# Patient Record
Sex: FEMALE | Race: ASIAN
[De-identification: names, ages, dates, MRNs, and addresses within clinical notes are randomized per-mention and may not be internally consistent; named-entity substitution may affect disease eponyms.]

---

## 2020-01-24 PROBLEM — Z00.00 ENCOUNTER FOR PREVENTIVE HEALTH EXAMINATION: Status: ACTIVE | Noted: 2020-01-24

## 2020-05-26 ENCOUNTER — APPOINTMENT (OUTPATIENT)
Dept: NEUROLOGY | Facility: CLINIC | Age: 75
End: 2020-05-26

## 2022-09-22 ENCOUNTER — EMERGENCY (EMERGENCY)
Facility: HOSPITAL | Age: 77
LOS: 0 days | Discharge: HOME | End: 2022-09-22
Attending: EMERGENCY MEDICINE
Payer: MEDICARE

## 2022-09-22 VITALS
HEART RATE: 72 BPM | OXYGEN SATURATION: 95 % | TEMPERATURE: 98 F | RESPIRATION RATE: 17 BRPM | DIASTOLIC BLOOD PRESSURE: 61 MMHG | SYSTOLIC BLOOD PRESSURE: 127 MMHG

## 2022-09-22 VITALS — WEIGHT: 149.91 LBS

## 2022-09-22 DIAGNOSIS — Z79.82 LONG TERM (CURRENT) USE OF ASPIRIN: ICD-10-CM

## 2022-09-22 DIAGNOSIS — N39.0 URINARY TRACT INFECTION, SITE NOT SPECIFIED: ICD-10-CM

## 2022-09-22 DIAGNOSIS — E11.9 TYPE 2 DIABETES MELLITUS WITHOUT COMPLICATIONS: ICD-10-CM

## 2022-09-22 DIAGNOSIS — R31.9 HEMATURIA, UNSPECIFIED: ICD-10-CM

## 2022-09-22 LAB
ALBUMIN SERPL ELPH-MCNC: 3.5 G/DL — SIGNIFICANT CHANGE UP (ref 3.3–5)
ALP SERPL-CCNC: 74 U/L — SIGNIFICANT CHANGE UP (ref 40–120)
ALT FLD-CCNC: 25 U/L — SIGNIFICANT CHANGE UP (ref 12–78)
ANION GAP SERPL CALC-SCNC: 5 MMOL/L — SIGNIFICANT CHANGE UP (ref 5–17)
APPEARANCE UR: ABNORMAL
AST SERPL-CCNC: 11 U/L — LOW (ref 15–37)
BASOPHILS # BLD AUTO: 0.05 K/UL — SIGNIFICANT CHANGE UP (ref 0–0.2)
BASOPHILS NFR BLD AUTO: 0.5 % — SIGNIFICANT CHANGE UP (ref 0–2)
BILIRUB SERPL-MCNC: 0.4 MG/DL — SIGNIFICANT CHANGE UP (ref 0.2–1.2)
BILIRUB UR-MCNC: NEGATIVE — SIGNIFICANT CHANGE UP
BUN SERPL-MCNC: 14 MG/DL — SIGNIFICANT CHANGE UP (ref 7–23)
CALCIUM SERPL-MCNC: 9.4 MG/DL — SIGNIFICANT CHANGE UP (ref 8.5–10.1)
CHLORIDE SERPL-SCNC: 108 MMOL/L — SIGNIFICANT CHANGE UP (ref 96–108)
CO2 SERPL-SCNC: 27 MMOL/L — SIGNIFICANT CHANGE UP (ref 22–31)
COLOR SPEC: ABNORMAL
CREAT SERPL-MCNC: 0.93 MG/DL — SIGNIFICANT CHANGE UP (ref 0.5–1.3)
DIFF PNL FLD: ABNORMAL
EGFR: 63 ML/MIN/1.73M2 — SIGNIFICANT CHANGE UP
EOSINOPHIL # BLD AUTO: 0.14 K/UL — SIGNIFICANT CHANGE UP (ref 0–0.5)
EOSINOPHIL NFR BLD AUTO: 1.5 % — SIGNIFICANT CHANGE UP (ref 0–6)
GLUCOSE SERPL-MCNC: 242 MG/DL — HIGH (ref 70–99)
GLUCOSE UR QL: 1000 MG/DL
HCT VFR BLD CALC: 46.3 % — HIGH (ref 34.5–45)
HGB BLD-MCNC: 15.1 G/DL — SIGNIFICANT CHANGE UP (ref 11.5–15.5)
IMM GRANULOCYTES NFR BLD AUTO: 0.3 % — SIGNIFICANT CHANGE UP (ref 0–0.9)
KETONES UR-MCNC: NEGATIVE — SIGNIFICANT CHANGE UP
LEUKOCYTE ESTERASE UR-ACNC: ABNORMAL
LYMPHOCYTES # BLD AUTO: 2.08 K/UL — SIGNIFICANT CHANGE UP (ref 1–3.3)
LYMPHOCYTES # BLD AUTO: 22.4 % — SIGNIFICANT CHANGE UP (ref 13–44)
MCHC RBC-ENTMCNC: 29.7 PG — SIGNIFICANT CHANGE UP (ref 27–34)
MCHC RBC-ENTMCNC: 32.6 GM/DL — SIGNIFICANT CHANGE UP (ref 32–36)
MCV RBC AUTO: 91.1 FL — SIGNIFICANT CHANGE UP (ref 80–100)
MONOCYTES # BLD AUTO: 0.73 K/UL — SIGNIFICANT CHANGE UP (ref 0–0.9)
MONOCYTES NFR BLD AUTO: 7.9 % — SIGNIFICANT CHANGE UP (ref 2–14)
NEUTROPHILS # BLD AUTO: 6.24 K/UL — SIGNIFICANT CHANGE UP (ref 1.8–7.4)
NEUTROPHILS NFR BLD AUTO: 67.4 % — SIGNIFICANT CHANGE UP (ref 43–77)
NITRITE UR-MCNC: NEGATIVE — SIGNIFICANT CHANGE UP
PH UR: 6 — SIGNIFICANT CHANGE UP (ref 5–8)
PLATELET # BLD AUTO: 279 K/UL — SIGNIFICANT CHANGE UP (ref 150–400)
POTASSIUM SERPL-MCNC: 3.9 MMOL/L — SIGNIFICANT CHANGE UP (ref 3.5–5.3)
POTASSIUM SERPL-SCNC: 3.9 MMOL/L — SIGNIFICANT CHANGE UP (ref 3.5–5.3)
PROT SERPL-MCNC: 7.4 GM/DL — SIGNIFICANT CHANGE UP (ref 6–8.3)
PROT UR-MCNC: 30 MG/DL
RBC # BLD: 5.08 M/UL — SIGNIFICANT CHANGE UP (ref 3.8–5.2)
RBC # FLD: 14.8 % — HIGH (ref 10.3–14.5)
SODIUM SERPL-SCNC: 140 MMOL/L — SIGNIFICANT CHANGE UP (ref 135–145)
SP GR SPEC: 1.01 — SIGNIFICANT CHANGE UP (ref 1.01–1.02)
UROBILINOGEN FLD QL: NEGATIVE — SIGNIFICANT CHANGE UP
WBC # BLD: 9.27 K/UL — SIGNIFICANT CHANGE UP (ref 3.8–10.5)
WBC # FLD AUTO: 9.27 K/UL — SIGNIFICANT CHANGE UP (ref 3.8–10.5)

## 2022-09-22 PROCEDURE — 99283 EMERGENCY DEPT VISIT LOW MDM: CPT

## 2022-09-22 PROCEDURE — 81001 URINALYSIS AUTO W/SCOPE: CPT

## 2022-09-22 PROCEDURE — 80053 COMPREHEN METABOLIC PANEL: CPT

## 2022-09-22 PROCEDURE — 87086 URINE CULTURE/COLONY COUNT: CPT

## 2022-09-22 PROCEDURE — 99284 EMERGENCY DEPT VISIT MOD MDM: CPT | Mod: FS

## 2022-09-22 PROCEDURE — 36415 COLL VENOUS BLD VENIPUNCTURE: CPT

## 2022-09-22 PROCEDURE — 85025 COMPLETE CBC W/AUTO DIFF WBC: CPT

## 2022-09-22 RX ORDER — CEPHALEXIN 500 MG
500 CAPSULE ORAL ONCE
Refills: 0 | Status: COMPLETED | OUTPATIENT
Start: 2022-09-22 | End: 2022-09-22

## 2022-09-22 RX ORDER — CEPHALEXIN 500 MG
1 CAPSULE ORAL
Qty: 21 | Refills: 0
Start: 2022-09-22

## 2022-09-22 RX ADMIN — Medication 500 MILLIGRAM(S): at 13:30

## 2022-09-22 NOTE — ED STATDOCS - PATIENT PORTAL LINK FT
You can access the FollowMyHealth Patient Portal offered by [facility] by registering at the following website: [email or web address] By joining [employer]’s FollowMyHealth portal, you will also be able to view your health information using other applications (apps) compatible with our system.

## 2022-09-22 NOTE — ED STATDOCS - CARE PROVIDER_API CALL
Bryan Romero)  Urology  [address], [address] [place] [address]  Phone: [phone]  Fax: [phone]  Follow Up Time: Urgent

## 2022-09-22 NOTE — ED STATDOCS - PROGRESS NOTE DETAILS
PA note: All labwork/radiology results discussed in detail with patient. Patient re-examined and re-evaluated. Patient feels much better at this time. ED evaluation, Diagnosis and management discussed with the patient in detail. Workup results discussed with ED attending, OK to [place] home. Close UROLOGY MD follow up encouraged, aftercare to assist with scheduling appointment ASAP. Strict ED return instructions discussed in detail and patient given the opportunity to ask any questions about their discharge diagnosis and instructions. Patient verbalized understanding. ~ Gopi Roy PA-C PA: Patient is a 77 y/o female with PMHx of DM who presents to [facility] c/o hematuria x2 days. Pt states that she only has bleeding when she urinates and does not have any other vaginal bleeding. DENIES fever, chills, flank or back pain. No hx of kidney stones. ~Gopi Roy PA-C

## 2022-09-22 NOTE — ED STATDOCS - OBJECTIVE STATEMENT
77 y/o female with PMHx of DM presents to the ED c/o hematuria x2 days. Pt states that she only has bleeding when she urinates and does not have any other vaginal bleeding. Denies any recent traumas. Pt is on ASA. Denies pain anywhere else, denies lightheadedness, denies weakness.

## 2022-09-22 NOTE — ED STATDOCS - CLINICAL SUMMARY MEDICAL DECISION MAKING FREE TEXT BOX
Unclear etiology of hematuria, pt still urinating without issue. No significant risk factors for major hematuria, does not appear consistent with kidney stone. Will eval for uti vs hemorrhagic cystitis. Will likely need gyn outpatient follow up. Dispo pending UA Unclear etiology of hematuria, pt still urinating without issue. No significant risk factors for major hematuria, does not appear consistent with kidney stone. Will eval for uti vs hemorrhagic cystitis. Will likely need gyn outpatient follow up. Dispo pending UA    PA note: All labwork/radiology results discussed in detail with patient. Patient re-examined and re-evaluated. Patient feels much better at this time. ED evaluation, Diagnosis and management discussed with the patient in detail. Workup results discussed with ED attending, OK to [place] home. Close UROLOGY MD follow up encouraged, aftercare to assist with scheduling appointment ASAP. Strict ED return instructions discussed in detail and patient given the opportunity to ask any questions about their discharge diagnosis and instructions. Patient verbalized understanding. ~ Gopi Roy PA-C

## 2022-09-22 NOTE — ED ADULT TRIAGE NOTE - CHIEF COMPLAINT QUOTE
Pt c/o painless hematuria x2 days. On aspirin. Instructed to come to ED by PCP. Denies any other complaints.

## 2022-09-25 LAB
CULTURE RESULTS: SIGNIFICANT CHANGE UP
SPECIMEN SOURCE: SIGNIFICANT CHANGE UP

## 2023-07-26 NOTE — ED STATDOCS - NSFOLLOWUPINSTRUCTIONS_ED_ALL_ED_FT
26-Jul-2023 Urinary Tract Infection, Adult       A urinary tract infection (UTI) is an infection of any part of the urinary tract. The urinary tract includes:  •The kidneys.      •The ureters.      •The bladder.      •The urethra.      These organs make, store, and get rid of pee (urine) in the body.      What are the causes?    This infection is caused by germs (bacteria) in your genital area. These germs grow and cause swelling (inflammation) of your urinary tract.      What increases the risk?    The following factors may make you more likely to develop this condition:  •Using a small, thin tube (catheter) to drain pee.      •Not being able to control when you pee or poop (incontinence).    •Being female. If you are female, these things can increase the risk:•Using these methods to prevent pregnancy:  •A medicine that kills sperm (spermicide).      •A device that blocks sperm (diaphragm).        •Having low levels of a female hormone (estrogen).      •Being pregnant.        You are more likely to develop this condition if:  •You have genes that add to your risk.      •You are sexually active.      •You take antibiotic medicines.     •You have trouble peeing because of:  •A prostate that is bigger than normal, if you are male.      •A blockage in the part of your body that drains pee from the bladder.      •A kidney stone.       •A nerve condition that affects your bladder.      •Not getting enough to drink.       •Not peeing often enough.      •You have other conditions, such as:  •Diabetes.       •A weak disease-fighting system (immune system).      •Sickle cell disease.       •Gout.       •Injury of the spine.          What are the signs or symptoms?    Symptoms of this condition include:  •Needing to pee right away.      •Peeing small amounts often.      •Pain or burning when peeing.      •Blood in the pee.      •Pee that smells bad or not like normal.      •Trouble peeing.      •Pee that is cloudy.      •Fluid coming from the vagina, if you are female.      •Pain in the belly or lower back.      Other symptoms include:  •Vomiting.      •Not feeling hungry.      •Feeling mixed up (confused). This may be the first symptom in older adults.      •Being tired and grouchy (irritable).      •A fever.      •Watery poop (diarrhea).        How is this treated?    •Taking antibiotic medicine.      •Taking other medicines.      •Drinking enough water.      In some cases, you may need to see a specialist.      Follow these instructions at home:     Medicines     •Take over-the-counter and prescription medicines only as told by your doctor.      •If you were prescribed an antibiotic medicine, take it as told by your doctor. Do not stop taking it even if you start to feel better.      General instructions   •Make sure you:  •Pee until your bladder is empty.       •Do not hold pee for a long time.      •Empty your bladder after sex.      •Wipe from front to back after peeing or pooping if you are a female. Use each tissue one time when you wipe.        •Drink enough fluid to keep your pee pale yellow.      •Keep all follow-up visits.        Contact a doctor if:    •You do not get better after 1–2 days.      •Your symptoms go away and then come back.        Get help right away if:    •You have very bad back pain.      •You have very bad pain in your lower belly.      •You have a fever.      •You have chills.      •You feeling like you will vomit or you vomit.        Summary    •A urinary tract infection (UTI) is an infection of any part of the urinary tract.      •This condition is caused by germs in your genital area.      •There are many risk factors for a UTI.      •Treatment includes antibiotic medicines.      •Drink enough fluid to keep your pee pale yellow.      This information is not intended to replace advice given to you by your health care provider. Make sure you discuss any questions you have with your health care provider.                                                                                                   Hematuria, Adult       Hematuria is blood in the urine. Blood may be visible in the urine, or it may be identified with a test. This condition can be caused by infections of the bladder, urethra, kidney, or prostate. Other possible causes include:  • Urinary tract infection (Bladder Infection)    •Kidney stones.      •Cancer of the urinary tract.      •Too much calcium in the urine.      •Conditions that are passed from parent to child (inherited conditions).       •Exercise that requires a lot of energy.      Infections can usually be treated with medicine, and a kidney stone usually will pass through your urine. If neither of these is the cause of your hematuria, more tests may be needed to identify the cause of your symptoms.    It is very important to tell your health care provider about any blood in your urine, even if it is painless or the blood stops without treatment. Blood in the urine, when it happens and then stops and then happens again, can be a symptom of a very serious condition, including cancer. There is no pain in the initial stages of many urinary cancers.      Follow these instructions at home:    Medicines     •Take over-the-counter and prescription medicines only as told by your health care provider.      •If you were prescribed an antibiotic medicine, take it as told by your health care provider. Do not stop taking the antibiotic even if you start to feel better.      Eating and drinking     •Drink enough fluid to keep your urine pale yellow. It is recommended that you drink 3–4 quarts (2.8–3.8 L) a day. If you have been diagnosed with an infection, drinking cranberry juice in addition to large amounts of water is recommended.      •Avoid caffeine, tea, and carbonated beverages. These tend to irritate the bladder.      •Avoid alcohol because it may irritate the prostate (in males).      General instructions     •If you have been diagnosed with a kidney stone, follow your health care provider's instructions about straining your urine to catch the stone.      •Empty your bladder often. Avoid holding urine for long periods of time.    •If you are female:  •After a bowel movement, wipe from front to back and use each piece of toilet paper only once.      •Empty your bladder before and after sex.        •Pay attention to any changes in your symptoms. Tell your health care provider about any changes or any new symptoms.      •It is up to you to get the results of any tests. Ask your health care provider, or the department that is doing the test, when your results will be ready.      •Keep all follow-up visits. This is important.        Contact a health care provider if:    •You develop back pain.      •You have a fever or chills.      •You have nausea or vomiting.      •Your symptoms do not improve after 3 days.      •Your symptoms get worse.        Get help right away if:    •You develop severe vomiting and are unable to take medicine without vomiting.      •You develop severe pain in your back or abdomen even though you are taking medicine.      •You pass a large amount of blood in your urine.      •You pass blood clots in your urine.      •You feel very weak or like you might faint.      •You faint.        Summary    •Hematuria is blood in the urine. It has many possible causes.      •It is very important that you tell your health care provider about any blood in your urine, even if it is painless or the blood stops without treatment.      •Take over-the-counter and prescription medicines only as told by your health care provider.      •Drink enough fluid to keep your urine pale yellow.      This information is not intended to replace advice given to you by your health care provider. Make sure you discuss any questions you have with your health care provider.